# Patient Record
Sex: FEMALE | Race: BLACK OR AFRICAN AMERICAN | Employment: UNEMPLOYED | URBAN - METROPOLITAN AREA
[De-identification: names, ages, dates, MRNs, and addresses within clinical notes are randomized per-mention and may not be internally consistent; named-entity substitution may affect disease eponyms.]

---

## 2017-06-08 PROBLEM — E66.01 MORBID OBESITY WITH BMI OF 40.0-44.9, ADULT (HCC): Status: ACTIVE | Noted: 2017-06-08

## 2017-06-08 PROBLEM — F33.41 RECURRENT MAJOR DEPRESSIVE DISORDER, IN PARTIAL REMISSION (HCC): Status: ACTIVE | Noted: 2017-06-08

## 2017-06-08 PROBLEM — F17.210 CIGARETTE SMOKER: Status: ACTIVE | Noted: 2017-06-08

## 2017-08-03 PROBLEM — R30.0 DYSURIA: Status: ACTIVE | Noted: 2017-01-26

## 2018-10-31 ENCOUNTER — HOSPITAL ENCOUNTER (OUTPATIENT)
Dept: PHYSICAL THERAPY | Age: 58
Discharge: HOME | End: 2018-10-31
Payer: MEDICARE

## 2018-10-31 PROCEDURE — G8987 SELF CARE CURRENT STATUS: HCPCS

## 2018-10-31 PROCEDURE — 97162 PT EVAL MOD COMPLEX 30 MIN: CPT

## 2018-10-31 PROCEDURE — G8988 SELF CARE GOAL STATUS: HCPCS

## 2018-10-31 NOTE — THERAPY EVALUATION
Giulianocori Garner  : 1960  [place] [employer]  [place] [facility]  [address], [place] [address]  [phone]   [phone]        OUTPATIENT PHYSICAL THERAPY:Initial Assessment 2018    ICD-10: Treatment Diagnosis: N39.46 Mixed incontinence (Urge and stress incontinence), R35.1 Nocturia, R27.8 Lack of coordination (muscle incoordination)  Precautions/Allergies:   Asa-acetaminophen-caff-potass and Aspirin   Fall Risk Score: 1 (? 5 = High Risk)  MD Orders: Evaluate and treat MEDICAL/REFERRING DIAGNOSIS:  Mixed incontinence [N39.46]  Other specified disorders of muscle [M62.89]   DATE OF ONSET: chronic  REFERRING PHYSICIAN: Elias Chisholm MD  RETURN PHYSICIAN APPOINTMENT: TBD     INITIAL ASSESSMENT:  Ms. Dianne Maradiaga presents with signs and symptoms consistent with mixed urinary incontinence as well as bowel dysfunction 2/2 reported IBS. She reports bowel and bladder habits that may be making symptoms worse. Additionally she has significant swelling in B LE which could be increasing nocturia and enuresis. Due to time limitations and pt request, physical examination was deferred to next visit with pt consent. PROBLEM LIST (Impacting functional limitations):  1. Decreased ADL/Functional Activities  2. Increased Pain  3. Decreased [place] with Home Exercise Program INTERVENTIONS PLANNED:  1. Electrical Stimulation  2. Home Exercise Program (HEP)  3. Manual Therapy  4. Neuromuscular Re-education/Strengthening  5. Therapeutic Activites  6. Therapeutic Exercise/Strengthening   TREATMENT PLAN:  Effective Dates: 10/31/2018 TO 2019 (90 days). Frequency/Duration: 1 time a week for 90 Days  GOALS: (Goals have been discussed and agreed upon with patient.)  Short-Term Functional Goals: Time Frame: 6 weeks  1.  Pt will increase water intake by 16 oz and decrease irritant consumption by 8 oz to improve bladder health and decrease UI.  2. Pt will be able to verbalize understanding of swelling management techniques for improved fluid management in order to decreased nocturia and night time urinary accidents. 3. Pt will demonstrate I with basic PFM HEP to improve awareness, coordination, and timing of PFM. Discharge Goals: Time Frame: 12 weeks  1. Pt will demonstrate 10 quick flicks of the pelvic floor muscle group, without compensation, to implement urge suppression appropriately with urgency of urination and decrease the number of pads used per day. 2. Pt will be able to sustain PFM contraction for 10 seconds for improved bowel control. Pt will demonstrate I with advanced core stabilization and general mobility program to facilitate carry over and I management of symptoms. Rehabilitation Potential For Stated Goals: Good  Regarding Rachael Naik's therapy, I certify that the treatment plan above will be carried out by a therapist or under their direction. Thank you for this referral,  Yann Bower, PT, DPT     Referring Physician Signature: Kris Ro MD              Date                    The information in this section was collected on 10/31/2018 (except where otherwise noted). HISTORY:   History of Present Injury/Illness (Reason for Referral):  Pt is a 63 yo F referred to PFPT 2/2 mixed urinary incontinence. Additionally she complains of chronic bowel dysfunction and nocturia. Pt states that she has had \"real bad\" urinary incontinence for about 1 year ago. She uses a towel between legs at night time due to UI, has this is when she has most difficulty controlling. Has recently started topical estrogen 3x/week. Her goals for physical therapy include improved bladder control to decrease frequency of urinary accidents. She notes a recent hospitalization due to bowel infection. Urinary: Frequency 7-8 x/day, 2 x/night. Positive for mixed urinary incontinence (SASHA), urgency.  Pt uses pads for protection; 1 pads per day (PPD). Denies frequency, incomplete emptying, urinary hesitancy, dysuria, hematuria. Fluid intake: 1/2 gallon water/day; bladder irritants include: cranapple juice 2c, gatorade 16oz  Bowel: Frequency 2x/day normal, but with upset stomach can vary. Positive for fecal incontinence (only with IBS flare ups per pt). Negative for pain with bowel movement (BM), pushing/straining with BM, incomplete emptying, constipation. [place] stool type: 4. Use of stool softeners or laxatives? No; History of diverticulitis  Sexual: Pt is not sexually active due to pain and does not care to be. Pelvic Organ Prolapse/Pelvic Pain: Location: abdominal pain 2/2 IBS. Worse with IBS flare, specific foods. Past Medical History/Comorbidities:   Ms. Margaret Pink  has a past medical history of Arthritis, Bipolar 1 disorder ([address] Claustrophobia, Esophageal stricture, Generalized anxiety disorder, GERD (gastroesophageal reflux disease), Heart failure ([address] Hiatal hernia, Hypertension, Hypertension, Irregular heart beat, Major depressive disorder, recurrent ([address] Morbid obesity with BMI of 45.0-49.9, adult ([address] Type 2 diabetes mellitus ([address] and Uncontrolled type 2 diabetes mellitus ([address] Ms. Margaret Pink  has a past surgical history that includes hx hysterectomy; hx ankle fracture tx (Right); hx colonoscopy; hx tonsil and adenoidectomy; hx lap cholecystectomy; LEFT SHOULDER ARTHROSCOPY, DECOMPRESSION, CLAVICLE EXCISION, ROTATOR CUFF REPAIR (Left, 2/27/2015); and LEFT KNEE LATERAL MENISCECTOMY, ABRASION ARTHROPLASTY (Left, 2/27/2015). Social History/Living Environment:    Pt live alone. Have you ever had any pelvic trauma (orthopedic in nature, fall, MVA, etc.)? Pt reports fall in 2006 results in ankle pain  Have you ever experienced any unwanted physical or sexual contact? No  Have you ever experienced any form of medical trauma (GYN, urological, GI, etc)?  No    Prior Level of Function/Work/Activity:  Chronic history of urinary incontinence and bowel dysfunction. Current Medications:       Current Outpatient Medications:     ALPRAZolam (XANAX) 1 mg tablet, Take 1 Tab by mouth three (3) times daily for 90 days. Max Daily Amount: 3 mg. FILL ON OR AFTER DUE DATE ONLY  Indications: anxiety, Disp: 90 Tab, Rfl: 2    sertraline (ZOLOFT) 100 mg tablet, Take 1 Tab by mouth daily for 90 days. Indications: ANXIETY WITH DEPRESSION, Disp: 30 Tab, Rfl: 2    baclofen (LIORESAL) 10 mg tablet, take 1 tablet by mouth every evening, Disp: , Rfl: 0    pantoprazole (PROTONIX) 40 mg tablet, take 1 tablet by mouth twice a day, Disp: , Rfl: 0    traMADol (ULTRAM) 50 mg tablet, take 1 tablet by mouth every 12 hours if needed for pain, Disp: , Rfl: 0    hydroxychloroquine (PLAQUENIL) 200 mg tablet, take 1 tablet by mouth once daily, Disp: , Rfl: 0    lisinopril-hydroCHLOROthiazide (PRINZIDE, ZESTORETIC) 20-12.5 mg per tablet, , Disp: , Rfl:     ezetimibe (ZETIA) 10 mg tablet, Take 10 mg by mouth every morning., Disp: , Rfl:     folic acid (FOLVITE) 1 mg tablet, Take 1 mg by mouth every morning., Disp: , Rfl:     omeprazole (PRILOSEC) 20 mg capsule, Take 20 mg by mouth as needed. , Disp: , Rfl:     omega-3 fatty acids-vitamin e (FISH OIL) 1,000 mg cap, Take 1 Cap by mouth every morning., Disp: , Rfl:     HYDROcodone-acetaminophen (NORCO) 7.5-325 mg per tablet, Take 1 Tab by mouth every six (6) hours as needed for Pain., Disp: 8 Tab, Rfl: 0    metFORMIN (GLUCOPHAGE) 1,000 mg tablet, Take 1,000 mg by mouth two (2) times daily (with meals). , Disp: , Rfl:    Date Last Reviewed:  11/1/2018   Number of Personal Factors/Comorbidities that affect the Plan of Care: 1-2: MODERATE COMPLEXITY   EXAMINATION:   Observation/Orthostatic Postural Assessment:          Due to timed and pt request, physical exam deferred to next appointment.   Palpation:          Due to timed and pt request, physical exam deferred to next appointment. ROM:          Due to timed and pt request, physical exam deferred to next appointment. Strength:          Due to timed and pt request, physical exam deferred to next appointment. Body Structures Involved:  1. Muscles Body Functions Affected:  1. Sensory/Pain  2. Genitourinary  3. Neuromusculoskeletal  4. Movement Related  5. Digestive Activities and Participation Affected:  1. Learning and Applying Knowledge  2. General Tasks and Demands  3. Mobility  4. Self Care   Number of elements (examined above) that affect the Plan of Care: 1-2: LOW COMPLEXITY   CLINICAL PRESENTATION:   Presentation: Evolving clinical presentation with changing clinical characteristics: MODERATE COMPLEXITY   CLINICAL DECISION MAKING:   Outcome Measure:   Pelvic Floor Impact Questionnaire (PFIQ-7)  Score (out of 300) Initial: 10/31/2018  Bladder/urinary: 48  Bowel/rectum: 52  Vagina/pelvis: 24  Total: 124/300 Most Recent:      Interpretation of Score: This survey asks questions concerning certain bowel, bladder, or pelvic symptoms and how much these symptoms interfere with daily activities. Each section is scored on a 0-3 scale, 3 representing the greatest disability. The scores of each section (out of 100) are added together for a total score out of 300. Score 0 1-59  120-179 180-239 240-299 300   Modifier CH CI CJ CK CL CM CN ? Self Care:     - CURRENT STATUS: CK - 40%-59% impaired, limited or restricted    - GOAL STATUS: CI - 1%-19% impaired, limited or restricted    - D/C STATUS:  ---------------To be determined---------------    Medical Necessity:   · Patient is expected to demonstrate progress in strength, range of motion, coordination and functional technique to improve bowel/bladder control. · Patient demonstrates good rehab potential due to higher previous functional level.   Reason for Services/Other Comments:  · Patient continues to require skilled intervention due to above mentioned deficits. Use of outcome tool(s) and clinical judgement create a POC that gives a: Clear prediction of patient's progress: LOW COMPLEXITY            TREATMENT:   (In addition to Assessment/Re-Assessment sessions the following treatments were rendered)  Pre-treatment Symptoms/Complaints: Pt arrived 15 minutes late to initial evaluation. Mixed urinary incontinence and bowel dysfunction  Pain: Initial:   Pain Intensity 1: 0  Post Session:  0     Assessment only today, no treatment provided. Pt was requested to complete bowel and bladder diary for review at next session. Treatment/Session Assessment:    · Response to Treatment:  Pt declined physical examination today due to. Pt reports good understanding of plan of care and was requested to complete bowel/bladder diary for review at next session. .  All questions were answered to pt's satisfaction. Pt was invited to call with any further questions or concerns. · Compliance with Program/Exercises: Will assess as treatment progresses. · Recommendations/Intent for next treatment session: \"Next visit will focus on assessment and treatment of PFM\".   Total Treatment Duration: Evaluation 40 minutes  PT Patient Time In/Time Out  Time In: 1345  Time Out: 1428    Future Appointments   Date Time Provider Jason Hunter   11/7/2018  1:30 PM Alberto Mcdaniels PT, DPT [facility]   11/14/2018  1:30 PM Papa Casey PT, DPT [facility]   11/20/2018  2:00 PM Alberto Mcdaniels PT, DPT [facility]   11/28/2018  1:30 PM Alberto Mcdaniels PT, DPT [facility]   12/5/2018  2:30 PM Alberto Mcdaniels PT, DPT [facility]   12/12/2018  2:30 PM Alberto Mcdaniels PT, DPT [facility]   12/20/2018  3:30 PM Nellie Valenzuela NP [facility]       Rogers Ma, PT, DPT

## 2018-10-31 NOTE — PROGRESS NOTES
Ambulatory/Rehab Services H2 Model Falls Risk Assessment    Risk Factor Pts. ·   Confusion/Disorientation/Impulsivity  []    4 ·   Symptomatic Depression  []   2 ·   Altered Elimination  []   1 ·   Dizziness/Vertigo  []   1 ·   Gender (Male)  []   1 ·   Any administered antiepileptics (anticonvulsants):  []   2 ·   Any administered benzodiazepines:  []   1 ·   Visual Impairment (specify):  []   1 ·   Portable Oxygen Use  []   1 ·   Orthostatic ? BP  []   1 ·   History of Recent Falls (within 3 mos.)  []   5     Ability to Rise from Chair (choose one) Pts. ·   Ability to rise in a single movement  []   0 ·   Pushes up, successful in one attempt  [x]   1 ·   Multiple attempts, but successful  []   3 ·   Unable to rise without assistance  []   4   Total: (5 or greater = High Risk) 1     Falls Prevention Plan:   []                Physical Limitations to Exercise (specify):   []                Mobility Assistance Device (type):   []                Exercise/Equipment Adaptation (specify):    ©2010 [facility] [address] [place] [address] Patent #[id number].  Federal Law prohibits the replication, distribution or use without written permission from [facility] [employer]

## 2018-11-07 ENCOUNTER — HOSPITAL ENCOUNTER (OUTPATIENT)
Dept: PHYSICAL THERAPY | Age: 58
Discharge: HOME | End: 2018-11-07
Payer: MEDICARE

## 2018-11-07 PROCEDURE — 97140 MANUAL THERAPY 1/> REGIONS: CPT

## 2018-11-07 PROCEDURE — 97530 THERAPEUTIC ACTIVITIES: CPT

## 2018-11-07 PROCEDURE — 97110 THERAPEUTIC EXERCISES: CPT

## 2018-11-07 NOTE — THERAPY EVALUATION
Peg Large  : 1960  [place] [employer] [place]  [place] [facility]  [address], [address], [place] [address]  [phone]   [phone]        OUTPATIENT PHYSICAL THERAPY:Daily Note 2018    ICD-10: Treatment Diagnosis: N39.46 Mixed incontinence (Urge and stress incontinence), R35.1 Nocturia, R27.8 Lack of coordination (muscle incoordination)  Precautions/Allergies:   Asa-acetaminophen-caff-potass and Aspirin   Fall Risk Score: 1 (? 5 = High Risk)  MD Orders: Evaluate and treat MEDICAL/REFERRING DIAGNOSIS:  Mixed incontinence [N39.46]  Other specified disorders of muscle [M62.89]   DATE OF ONSET: chronic  REFERRING PHYSICIAN: Elias Chisholm MD  RETURN PHYSICIAN APPOINTMENT: TBD     INITIAL ASSESSMENT:  Ms. Desmond Riddle presents with signs and symptoms consistent with mixed urinary incontinence as well as bowel dysfunction 2/2 reported IBS. She reports bowel and bladder habits that may be making symptoms worse. Additionally she has significant swelling in B LE which could be increasing nocturia and enuresis. Due to time limitations and pt request, physical examination was deferred to next visit with pt consent. PROBLEM LIST (Impacting functional limitations):  1. Decreased ADL/Functional Activities  2. Increased Pain  3. Decreased [place] with Home Exercise Program INTERVENTIONS PLANNED:  1. Electrical Stimulation  2. Home Exercise Program (HEP)  3. Manual Therapy  4. Neuromuscular Re-education/Strengthening  5. Therapeutic Activites  6. Therapeutic Exercise/Strengthening   TREATMENT PLAN:  Effective Dates: 10/31/2018 TO 2019 (90 days). Frequency/Duration: 1 time a week for 90 Days  GOALS: (Goals have been discussed and agreed upon with patient.)  Short-Term Functional Goals: Time Frame: 6 weeks  1.  Pt will increase water intake by 16 oz and decrease irritant consumption by 8 oz to improve bladder health and decrease UI.  2. Pt will be able to verbalize understanding of swelling management techniques for improved fluid management in order to decreased nocturia and night time urinary accidents. 3. Pt will demonstrate I with basic PFM HEP to improve awareness, coordination, and timing of PFM. Discharge Goals: Time Frame: 12 weeks  1. Pt will demonstrate 10 quick flicks of the pelvic floor muscle group, without compensation, to implement urge suppression appropriately with urgency of urination and decrease the number of pads used per day. 2. Pt will be able to sustain PFM contraction for 10 seconds for improved bowel control. 3. Pt will demonstrate I with advanced core stabilization and general mobility program to facilitate carry over and I management of symptoms. Rehabilitation Potential For Stated Goals: Good            The information in this section was collected on 10/31/2018 (except where otherwise noted). HISTORY:   History of Present Injury/Illness (Reason for Referral):  Pt is a 61 yo F referred to PFPT 2/2 mixed urinary incontinence. Additionally she complains of chronic bowel dysfunction and nocturia. Pt states that she has had \"real bad\" urinary incontinence for about 1 year ago. She uses a towel between legs at night time due to UI, has this is when she has most difficulty controlling. Has recently started topical estrogen 3x/week. Her goals for physical therapy include improved bladder control to decrease frequency of urinary accidents. She notes a recent hospitalization due to bowel infection. Urinary: Frequency 7-8 x/day, 2 x/night. Positive for mixed urinary incontinence (SASHA), urgency. Pt uses pads for protection; 1 pads per day (PPD). Denies frequency, incomplete emptying, urinary hesitancy, dysuria, hematuria.              Fluid intake: 1/2 gallon water/day; bladder irritants include: cranapple juice 2c, gatorade 16oz  Bowel: Frequency 2x/day normal, but with upset stomach can vary. Positive for fecal incontinence (only with IBS flare ups per pt). Negative for pain with bowel movement (BM), pushing/straining with BM, incomplete emptying, constipation. [place] stool type: 4. Use of stool softeners or laxatives? No; History of diverticulitis  Sexual: Pt is not sexually active due to pain and does not care to be. Pelvic Organ Prolapse/Pelvic Pain: Location: abdominal pain 2/2 IBS. Worse with IBS flare, specific foods. Past Medical History/Comorbidities:   Ms. Joseluis Blanco  has a past medical history of Arthritis, Bipolar 1 disorder ([address] Claustrophobia, Esophageal stricture, Generalized anxiety disorder, GERD (gastroesophageal reflux disease), Heart failure ([address] Hiatal hernia, Hypertension, Hypertension, Irregular heart beat, Major depressive disorder, recurrent ([facility] Morbid obesity with BMI of 45.0-49.9, adult ([facility] [address] Type 2 diabetes mellitus ([facility] [address] and Uncontrolled type 2 diabetes mellitus ([facility] [address] Ms. Joseluis Blanco  has a past surgical history that includes hx hysterectomy; hx ankle fracture tx (Right); hx colonoscopy; hx tonsil and adenoidectomy; hx lap cholecystectomy; LEFT SHOULDER ARTHROSCOPY, DECOMPRESSION, CLAVICLE EXCISION, ROTATOR CUFF REPAIR (Left, 2/27/2015); and LEFT KNEE LATERAL MENISCECTOMY, ABRASION ARTHROPLASTY (Left, 2/27/2015). Social History/Living Environment:    Pt lives alone. Have you ever had any pelvic trauma (orthopedic in nature, fall, MVA, etc.)? No  Have you ever experienced any unwanted physical or sexual contact? No  Have you ever experienced any form of medical trauma (GYN, urological, GI, etc)? No    Prior Level of Function/Work/Activity:  Chronic history of urinary incontinence and bowel dysfunction. Current Medications:       Current Outpatient Medications:     ALPRAZolam (XANAX) 1 mg tablet, Take 1 Tab by mouth three (3) times daily for 90 days. Max Daily Amount: 3 mg.  FILL ON OR AFTER DUE DATE ONLY  Indications: anxiety, Disp: 90 Tab, Rfl: 2   sertraline (ZOLOFT) 100 mg tablet, Take 1 Tab by mouth daily for 90 days. Indications: ANXIETY WITH DEPRESSION, Disp: 30 Tab, Rfl: 2    baclofen (LIORESAL) 10 mg tablet, take 1 tablet by mouth every evening, Disp: , Rfl: 0    pantoprazole (PROTONIX) 40 mg tablet, take 1 tablet by mouth twice a day, Disp: , Rfl: 0    traMADol (ULTRAM) 50 mg tablet, take 1 tablet by mouth every 12 hours if needed for pain, Disp: , Rfl: 0    hydroxychloroquine (PLAQUENIL) 200 mg tablet, take 1 tablet by mouth once daily, Disp: , Rfl: 0    lisinopril-hydroCHLOROthiazide (PRINZIDE, ZESTORETIC) 20-12.5 mg per tablet, , Disp: , Rfl:     ezetimibe (ZETIA) 10 mg tablet, Take 10 mg by mouth every morning., Disp: , Rfl:     folic acid (FOLVITE) 1 mg tablet, Take 1 mg by mouth every morning., Disp: , Rfl:     omeprazole (PRILOSEC) 20 mg capsule, Take 20 mg by mouth as needed. , Disp: , Rfl:     omega-3 fatty acids-vitamin e (FISH OIL) 1,000 mg cap, Take 1 Cap by mouth every morning., Disp: , Rfl:     HYDROcodone-acetaminophen (NORCO) 7.5-325 mg per tablet, Take 1 Tab by mouth every six (6) hours as needed for Pain., Disp: 8 Tab, Rfl: 0    metFORMIN (GLUCOPHAGE) 1,000 mg tablet, Take 1,000 mg by mouth two (2) times daily (with meals). , Disp: , Rfl:    Date Last Reviewed:  11/7/2018   EXAMINATION:   Observation/Orthostatic Postural Assessment:          Normal external genitalia  Palpation:          Via vaginal canal tenderness with palpation at pubococcygeus and iliococcygeus, bilaterally, improved w/ S/CS. Externally tenderness with palpation over L LQ and groin  ROM:          Excellent PFM ROM.   Strength:          P: Power, E: Endurance, R: Repetitions, QF: Quick Flicks, TrA: Transverse Abdominus, DB: Diaphragmatic Breathing  P 3/5   E 10 seconds w/ mild breath holding   R 4   QF 10   TrA    DB Mild breath holding        CLINICAL PRESENTATION:   CLINICAL DECISION MAKING:   Outcome Measure:   Pelvic Floor Impact Questionnaire (PFIQ-7)  Score (out of 300) Initial: 10/31/2018  Bladder/urinary: 48  Bowel/rectum: 52  Vagina/pelvis: 24  Total: 124/300 Most Recent:      Interpretation of Score: This survey asks questions concerning certain bowel, bladder, or pelvic symptoms and how much these symptoms interfere with daily activities. Each section is scored on a 0-3 scale, 3 representing the greatest disability. The scores of each section (out of 100) are added together for a total score out of 300. Score 0 1-59  120-179 180-239 240-299 300   Modifier CH CI CJ CK CL CM CN ? Self Care:     - CURRENT STATUS: CK - 40%-59% impaired, limited or restricted    - GOAL STATUS: CI - 1%-19% impaired, limited or restricted    - D/C STATUS:  ---------------To be determined---------------    Medical Necessity:   · Patient is expected to demonstrate progress in strength, range of motion, coordination and functional technique to improve bowel/bladder control. · Patient demonstrates good rehab potential due to higher previous functional level. Reason for Services/Other Comments:  · Patient continues to require skilled intervention due to above mentioned deficits. TREATMENT:   (In addition to Assessment/Re-Assessment sessions the following treatments were rendered)  Pre-treatment Symptoms/Complaints: Pt did not complete bladder diary this week. States that GI/bowel issues are much improved from last week. Pain: Initial:   Pain Intensity 1: 0  Post Session:  0     THERAPEUTIC ACTIVITY: ( 10 minutes): Therapeutic activities per grid below to improve coordination and control of urgency to decreased urinary leakage. Required moderate verbal cues to promote proper performance of urge suppression. THERAPEUTIC EXERCISE: (15 minutes):  Exercises per grid below to improve strength and coordination. Required minimal verbal cues to promote proper body breathing techniques.   Progressed resistance, range, repetitions and complexity of movement as indicated. MANUAL THERAPY: (20 minutes): Soft tissue mobilization was utilized and necessary because of the patient's restricted motion of soft tissue. TE Date:  11/7/2018 Date:   Date:     Activity/Exercise Parameters Parameters Parameters   PFM coordination Quick flicks and holds with manual palpation; coordination of breath with contraction        Date:  11/7/2018 Date:   Date:     Activity/Exercise Parameters Parameters Parameters   Urge suppression 5 minutes     Bladder diary 5 minutes       Date Type Location Comments   11/7/2018 Internal assessment/treatment Via vaginal canal Gentle strumming at levator ani    STM Lower abdominals Bladder mobilization, STM of L LQ     (Used abbreviations: MET - muscle energy technique; SCS- Strain counter strain; CTM-Connective tissue mobilizations; CR- Contract/relax; SP- Sustained pressure, TrP-Trigger point release, IASTM- Instrument assisted soft tissue mobilizations, TDN-Trigger point dry needling)    Treatment/Session Assessment:    · Response to Treatment:  Pt gives verbal consent to internal vaginal assessment/treatment without chaperon present. She has tenderness at puborectalis and ilicoccygeus B. This did improve with gentle soft tissue work and S/CS techniques. She demonstrates excellent coordination of PFM with quick contractions and kegels; mild breath holding is noted. She was instructed in urge suppression techniques and encouraged to complete bladder diary this week to pin point possible bladder irritants. · Compliance with Program/Exercises: Will assess as treatment progresses. · Recommendations/Intent for next treatment session: \"Next visit will focus on assessment and treatment of PFM\".   Total Treatment Duration: 45 minutes  PT Patient Time In/Time Out  Time In: 1325  Time Out: 1415    Future Appointments   Date Time Provider Jason Hunter   11/14/2018  1:30 PM Sandi Morris, PT, DPT [facility]   11/20/2018  2:00 PM Nix, Jerald Cottrell, PT, DPT [facility]   11/28/2018  1:30 PM Nathan Hernández, PT, DPT [facility]   12/5/2018  2:30 PM Nathan Hernández, PT, DPT [facility]   12/12/2018  2:30 PM Nathan Hernández, PT, DPT [facility]   12/20/2018  3:30 PM Andree Mccall NP [facility], PT, DPT

## 2018-12-05 ENCOUNTER — APPOINTMENT (OUTPATIENT)
Dept: PHYSICAL THERAPY | Age: 58
End: 2018-12-05

## 2018-12-12 ENCOUNTER — APPOINTMENT (OUTPATIENT)
Dept: PHYSICAL THERAPY | Age: 58
End: 2018-12-12

## 2018-12-22 PROBLEM — F17.210 CIGARETTE NICOTINE DEPENDENCE WITHOUT COMPLICATION: Status: ACTIVE | Noted: 2018-12-22

## 2019-11-26 PROBLEM — Z82.49 FAMILY HISTORY OF CHRONIC HEART FAILURE: Status: ACTIVE | Noted: 2019-11-26

## 2019-11-26 PROBLEM — E66.9 OBESITY (BMI 35.0-39.9 WITHOUT COMORBIDITY): Status: ACTIVE | Noted: 2019-11-26

## 2019-11-26 PROBLEM — R00.2 PALPITATIONS: Status: ACTIVE | Noted: 2019-11-26

## 2020-02-06 ENCOUNTER — TELEPHONE (OUTPATIENT)
Dept: NUTRITION | Age: 60
End: 2020-02-06

## 2020-02-06 NOTE — TELEPHONE ENCOUNTER
Nutrition Counseling: Contacted pt regarding referral. See notes documented in Nutrition Counseling Referral for details. No further follow-up contact from pt. Will close referral for this office.     [address] [employer] [address]  [phone]

## 2020-02-24 ENCOUNTER — TELEPHONE (OUTPATIENT)
Dept: NUTRITION | Age: 60
End: 2020-02-24

## 2020-02-24 NOTE — TELEPHONE ENCOUNTER
Nutrition Counseling: Contacted pt regarding referral. See notes documented in Nutrition Counseling Referral for details. No further follow-up contact from pt. Will close referral for this office.     [facility]  [phone]

## 2020-03-19 PROBLEM — I10 ESSENTIAL HYPERTENSION WITH GOAL BLOOD PRESSURE LESS THAN 130/85: Status: ACTIVE | Noted: 2020-03-19

## 2021-08-03 PROBLEM — I10 HYPERTENSION: Status: RESOLVED | Noted: 2021-08-03 | Resolved: 2021-08-03

## 2022-03-18 PROBLEM — F17.210 CIGARETTE NICOTINE DEPENDENCE WITHOUT COMPLICATION: Status: ACTIVE | Noted: 2018-12-22

## 2022-03-19 PROBLEM — R00.2 PALPITATIONS: Status: ACTIVE | Noted: 2019-11-26

## 2022-03-19 PROBLEM — E66.01 MORBID OBESITY WITH BMI OF 45.0-49.9, ADULT: Status: ACTIVE | Noted: 2017-06-08

## 2022-03-19 PROBLEM — R30.0 DYSURIA: Status: ACTIVE | Noted: 2017-01-26

## 2022-03-19 PROBLEM — E66.9 OBESITY (BMI 35.0-39.9 WITHOUT COMORBIDITY): Status: ACTIVE | Noted: 2019-11-26

## 2022-03-19 PROBLEM — F33.41 RECURRENT MAJOR DEPRESSIVE DISORDER, IN PARTIAL REMISSION (HCC): Status: ACTIVE | Noted: 2017-06-08

## 2022-03-19 PROBLEM — I10 ESSENTIAL HYPERTENSION WITH GOAL BLOOD PRESSURE LESS THAN 130/85: Status: ACTIVE | Noted: 2020-03-19

## 2022-03-19 PROBLEM — Z82.49 FAMILY HISTORY OF CHRONIC HEART FAILURE: Status: ACTIVE | Noted: 2019-11-26

## 2024-12-18 ENCOUNTER — OFFICE VISIT (OUTPATIENT)
Dept: ORTHOPEDIC SURGERY | Age: 64
End: 2024-12-18

## 2024-12-18 VITALS — HEIGHT: 65 IN | WEIGHT: 293 LBS | BODY MASS INDEX: 48.82 KG/M2

## 2024-12-18 DIAGNOSIS — M25.562 PAIN IN BOTH KNEES, UNSPECIFIED CHRONICITY: Primary | ICD-10-CM

## 2024-12-18 DIAGNOSIS — M25.561 PAIN IN BOTH KNEES, UNSPECIFIED CHRONICITY: Primary | ICD-10-CM

## 2024-12-18 DIAGNOSIS — M17.11 ARTHRITIS OF RIGHT KNEE: ICD-10-CM

## 2024-12-18 DIAGNOSIS — M17.12 ARTHRITIS OF LEFT KNEE: ICD-10-CM

## 2024-12-18 RX ORDER — METHYLPREDNISOLONE ACETATE 40 MG/ML
40 INJECTION, SUSPENSION INTRA-ARTICULAR; INTRALESIONAL; INTRAMUSCULAR; SOFT TISSUE ONCE
Status: COMPLETED | OUTPATIENT
Start: 2024-12-18 | End: 2024-12-18

## 2024-12-18 RX ORDER — METHYLPREDNISOLONE ACETATE 40 MG/ML
80 INJECTION, SUSPENSION INTRA-ARTICULAR; INTRALESIONAL; INTRAMUSCULAR; SOFT TISSUE ONCE
Status: CANCELLED | OUTPATIENT
Start: 2024-12-18 | End: 2024-12-18

## 2024-12-18 RX ADMIN — METHYLPREDNISOLONE ACETATE 40 MG: 40 INJECTION, SUSPENSION INTRA-ARTICULAR; INTRALESIONAL; INTRAMUSCULAR; SOFT TISSUE at 14:29

## 2024-12-18 RX ADMIN — METHYLPREDNISOLONE ACETATE 40 MG: 40 INJECTION, SUSPENSION INTRA-ARTICULAR; INTRALESIONAL; INTRAMUSCULAR; SOFT TISSUE at 14:28

## 2024-12-18 NOTE — PROGRESS NOTES
ALPRAZolam (XANAX) 0.5 MG tablet Take 0.5 mg by mouth 3 times daily as needed. 19   Automatic Reconciliation, Ar   cariprazine hcl (VRAYLAR) 4.5 MG CAPS capsule Take 4.5 mg by mouth daily 19   Automatic Reconciliation, Ar   estradiol (ESTRACE) 0.1 MG/GM vaginal cream INSERT 1 GRAM INTO VAGINA WITH FINGER AT BEDTIME 5 TIMES PER WEEK... (REFER TO PRESCRIPTION NOTES). 19   Automatic Reconciliation, Ar   hydrOXYzine (VISTARIL) 50 MG capsule Take 50 mg by mouth 19   Automatic Reconciliation, Ar   lisinopril-hydroCHLOROthiazide (PRINZIDE;ZESTORETIC) 20-12.5 MG per tablet Take 1 tablet by mouth daily 17   Automatic Reconciliation, Ar   metoprolol succinate (TOPROL XL) 50 MG extended release tablet Take 50 mg by mouth daily 20   Automatic Reconciliation, Ar   nystatin (MYCOSTATIN) 019683 UNIT/GM cream APPLY TOPICALLY TWICE A DAY 19   Automatic Reconciliation, Ar   omeprazole (PRILOSEC) 20 MG delayed release capsule Take 20 mg by mouth as needed    Automatic Reconciliation, Ar   sertraline (ZOLOFT) 100 MG tablet Take 100 mg by mouth daily 19   Automatic Reconciliation, Ar   traMADol (ULTRAM) 50 MG tablet Take 1 tablet by mouth every 12 hours if needed for pain 10/25/17   Automatic Reconciliation, Ar       Family History:     Family History   Problem Relation Age of Onset    Hypertension Brother     Hypertension Brother     Hypertension Sister     Diabetes Father     Hypertension Brother     Heart Disease Mother        Social History:      Social History     Tobacco Use    Smoking status: Former     Current packs/day: 0.00     Types: Cigarettes     Quit date: 2019     Years since quittin.9    Smokeless tobacco: Never    Tobacco comments:     Quit smoking: social smoker   Substance Use Topics    Alcohol use: No         Allergies:      Allergies   Allergen Reactions    Aspirin Other (See Comments)        Vitals:   Ht 1.651 m (5' 5\")   Wt 134.7 kg (297 lb)   BMI 49.42 kg/m²

## 2024-12-19 ENCOUNTER — TELEPHONE (OUTPATIENT)
Dept: ORTHOPEDIC SURGERY | Age: 64
End: 2024-12-19

## 2024-12-19 NOTE — TELEPHONE ENCOUNTER
Spoke with patient and informed her that Meenakshi will be call her regarding the genicular artery embolization procedure. Patient verbalized understanding and voiced no other concerns at this time.

## 2024-12-19 NOTE — TELEPHONE ENCOUNTER
She is wanting to know more information about the surgery and wanted to see the notes in her chart but she can't see them from her visit. Please call.

## 2025-01-08 ENCOUNTER — TELEMEDICINE (OUTPATIENT)
Age: 65
End: 2025-01-08
Payer: MEDICARE

## 2025-01-08 DIAGNOSIS — M25.562 CHRONIC PAIN OF LEFT KNEE: ICD-10-CM

## 2025-01-08 DIAGNOSIS — M17.0 OSTEOARTHRITIS OF BOTH KNEES, UNSPECIFIED OSTEOARTHRITIS TYPE: ICD-10-CM

## 2025-01-08 DIAGNOSIS — G89.29 CHRONIC PAIN OF LEFT KNEE: ICD-10-CM

## 2025-01-08 DIAGNOSIS — E66.01 MORBID OBESITY: Primary | ICD-10-CM

## 2025-01-08 DIAGNOSIS — R26.2 UNABLE TO AMBULATE: ICD-10-CM

## 2025-01-08 PROCEDURE — 99423 OL DIG E/M SVC 21+ MIN: CPT | Performed by: PHYSICIAN ASSISTANT

## 2025-01-08 NOTE — PROGRESS NOTES
[facility]  [address] [place] [id number]    PROGRESS NOTE  Virtual VISIT                             Brii Mccann   1960   01/08/25   Jhony Agustin MD     Virtual visit: patient agrees to this modality for the visit.    Chief Complaint   Patient presents with    Other     Consult- Genicular artery embolization.        History of Present Illness:    Patient has history of morbid obesity and chronic knee pain due to osteoarthritis of both knees.  Pain much worse in the left knee.  She has a history of pituitary tumor status post transsphenoidal resection on 10- without complications.  Patient [facility] most recently Dr. Agustin on 12-.  It appears that patient has been unable to ambulate for approximately 6+ months and was not deemed a candidate for knee surgery due to her morbid obesity.  She is currently on Mounjaro for weight loss and diabetes.      Patient indicates that when she last saw her orthopedic physician she was given a steroid injection in her knees which has helped significantly with her pain in her left knee.  She however still does not participate in physical therapy as she feels that her knees give out and the left knee is extremely painful.  She occasionally has right knee pain as well but not as severe.     Patient was referred to our department for consideration of potential geniculate artery embolization to help with her chronic osteoarthritic knee pain.    Past Medical History:    Past Medical History:   Diagnosis Date    Arthritis     generalized     Bipolar 1 disorder (HCC)     managed with medication     Claustrophobia     Esophageal stricture     Fracture 2006    rightr ankle    Generalized anxiety disorder     GERD (gastroesophageal reflux disease)     prn medication     Heart failure (HCC)     Hiatal hernia     Hypertension     managed with

## 2025-03-26 ENCOUNTER — OFFICE VISIT (OUTPATIENT)
Dept: ORTHOPEDIC SURGERY | Age: 65
End: 2025-03-26

## 2025-03-26 DIAGNOSIS — M17.12 ARTHRITIS OF LEFT KNEE: Primary | ICD-10-CM

## 2025-03-26 DIAGNOSIS — M17.11 ARTHRITIS OF RIGHT KNEE: ICD-10-CM

## 2025-03-26 RX ORDER — METHYLPREDNISOLONE ACETATE 40 MG/ML
40 INJECTION, SUSPENSION INTRA-ARTICULAR; INTRALESIONAL; INTRAMUSCULAR; SOFT TISSUE ONCE
Status: COMPLETED | OUTPATIENT
Start: 2025-03-26 | End: 2025-03-26

## 2025-03-26 RX ADMIN — METHYLPREDNISOLONE ACETATE 40 MG: 40 INJECTION, SUSPENSION INTRA-ARTICULAR; INTRALESIONAL; INTRAMUSCULAR; SOFT TISSUE at 11:00

## 2025-03-26 RX ADMIN — METHYLPREDNISOLONE ACETATE 40 MG: 40 INJECTION, SUSPENSION INTRA-ARTICULAR; INTRALESIONAL; INTRAMUSCULAR; SOFT TISSUE at 10:59

## 2025-03-26 NOTE — PROGRESS NOTES
Diagnosis: osteoarthritis both knee       PROCEDURE: Depo-medrol injection both knee        The risks and benefits of an intra articular injection were discussed with patient.  The patients name, , procedure to be performed, and laterality were confirmed with the patient. These risks include erythema, worsening pain, worsening arthritis, possible joint infection.    The  both knee was sprayed with cold spray and sterilized with alcohol.  Using a 22 gauge needle, 2cc of clear was aspirated. A mixture of 3cc of 0.25% Bupivacaine and 1cc of 40mg/ml of DepoMedrol was injected into the knee.  A bandaid was placed.      How tolerated by patient: tolerated the procedure well with no complications    Post injection instructions were given to Brii Mccann:       She is not a surgical candidate would like to pursue geniculate nerve ablation.  Will get her for to Dr. Haq for discussion of this procedure.  We did discuss it is possible that this is not going to help her however it is worth discussing given that she is not a surgical candidate

## 2025-06-30 ENCOUNTER — OFFICE VISIT (OUTPATIENT)
Dept: ORTHOPEDIC SURGERY | Age: 65
End: 2025-06-30
Payer: MEDICARE

## 2025-06-30 DIAGNOSIS — M17.12 ARTHRITIS OF LEFT KNEE: Primary | ICD-10-CM

## 2025-06-30 DIAGNOSIS — M17.11 ARTHRITIS OF RIGHT KNEE: ICD-10-CM

## 2025-06-30 PROCEDURE — 20610 DRAIN/INJ JOINT/BURSA W/O US: CPT | Performed by: ORTHOPAEDIC SURGERY

## 2025-06-30 RX ORDER — METHYLPREDNISOLONE ACETATE 40 MG/ML
40 INJECTION, SUSPENSION INTRA-ARTICULAR; INTRALESIONAL; INTRAMUSCULAR; SOFT TISSUE ONCE
Status: COMPLETED | OUTPATIENT
Start: 2025-06-30 | End: 2025-06-30

## 2025-06-30 RX ORDER — OXYBUTYNIN CHLORIDE 10 MG/1
10 TABLET, EXTENDED RELEASE ORAL DAILY
COMMUNITY
Start: 2025-05-12

## 2025-06-30 RX ORDER — FUROSEMIDE 20 MG/1
20 TABLET ORAL 2 TIMES DAILY
COMMUNITY

## 2025-06-30 RX ORDER — ACETAMINOPHEN 160 MG
TABLET,DISINTEGRATING ORAL DAILY
COMMUNITY
Start: 2025-05-30

## 2025-06-30 RX ORDER — CHOLESTYRAMINE 4 G/9G
POWDER, FOR SUSPENSION ORAL
COMMUNITY
Start: 2025-05-25

## 2025-06-30 RX ORDER — TIRZEPATIDE 12.5 MG/.5ML
INJECTION, SOLUTION SUBCUTANEOUS
COMMUNITY
Start: 2025-06-16

## 2025-06-30 RX ORDER — NYSTATIN TOPICAL POWDER 100000 U/G
POWDER TOPICAL
COMMUNITY
Start: 2025-05-06

## 2025-06-30 RX ORDER — CELECOXIB 100 MG/1
100 CAPSULE ORAL 2 TIMES DAILY
COMMUNITY

## 2025-06-30 RX ORDER — ELUXADOLINE 100 MG/1
1 TABLET, FILM COATED ORAL 2 TIMES DAILY
COMMUNITY
Start: 2025-05-27

## 2025-06-30 RX ADMIN — METHYLPREDNISOLONE ACETATE 40 MG: 40 INJECTION, SUSPENSION INTRA-ARTICULAR; INTRALESIONAL; INTRAMUSCULAR; SOFT TISSUE at 15:02

## 2025-06-30 RX ADMIN — METHYLPREDNISOLONE ACETATE 40 MG: 40 INJECTION, SUSPENSION INTRA-ARTICULAR; INTRALESIONAL; INTRAMUSCULAR; SOFT TISSUE at 15:03

## 2025-06-30 NOTE — PROGRESS NOTES
Diagnosis: osteoarthritis both knee       PROCEDURE: Depo-medrol injection both knee        The risks and benefits of an intra articular injection were discussed with patient.  The patients name, , procedure to be performed, and laterality were confirmed with the patient. These risks include erythema, worsening pain, worsening arthritis, possible joint infection.    The  both knee was sprayed with cold spray and sterilized with alcohol.  Using a 22 gauge needle, 2cc of clear was aspirated. A mixture of 3cc of 0.25% Bupivacaine and 1cc of 40mg/ml of DepoMedrol was injected into the knee.  A bandaid was placed.      How tolerated by patient: tolerated the procedure well with no complications    Post injection instructions were given to Brii Mccann: